# Patient Record
Sex: MALE | Race: OTHER | NOT HISPANIC OR LATINO | URBAN - METROPOLITAN AREA
[De-identification: names, ages, dates, MRNs, and addresses within clinical notes are randomized per-mention and may not be internally consistent; named-entity substitution may affect disease eponyms.]

---

## 2017-03-01 ENCOUNTER — IMPORTED ENCOUNTER (OUTPATIENT)
Dept: URBAN - METROPOLITAN AREA CLINIC 1 | Facility: CLINIC | Age: 62
End: 2017-03-01

## 2017-03-01 PROBLEM — H40.013: Noted: 2017-03-01

## 2017-03-01 PROBLEM — H25.13: Noted: 2017-03-01

## 2017-03-01 PROBLEM — H43.393: Noted: 2017-03-01

## 2017-03-01 PROBLEM — H53.19: Noted: 2017-03-01

## 2017-03-01 PROCEDURE — 92004 COMPRE OPH EXAM NEW PT 1/>: CPT

## 2017-03-01 PROCEDURE — 92015 DETERMINE REFRACTIVE STATE: CPT

## 2017-03-01 PROCEDURE — 92250 FUNDUS PHOTOGRAPHY W/I&R: CPT

## 2017-03-01 NOTE — PATIENT DISCUSSION
1.  Floaters OU- RD precautions. 2.  Photopsia OD 3. Cataract OU: Observe for now without intervention. The patient was advised to contact us if any change or worsening of vision4. COAG suspect OU (CD 0.75 OU): IOP 15 OU today. Condition was discussed with patient. Will monitor patient for progression. Baseline fundus photos today. MRX for glasses givenReturn for an appointment in 1 month for 10 OCT and VF 24-2 with Dr. Taina Pena.

## 2017-04-05 ENCOUNTER — IMPORTED ENCOUNTER (OUTPATIENT)
Dept: URBAN - METROPOLITAN AREA CLINIC 1 | Facility: CLINIC | Age: 62
End: 2017-04-05

## 2017-04-05 PROBLEM — H25.13: Noted: 2017-04-05

## 2017-04-05 PROBLEM — H40.023: Noted: 2017-04-05

## 2017-04-05 PROCEDURE — 92133 CPTRZD OPH DX IMG PST SGM ON: CPT

## 2017-04-05 PROCEDURE — 92012 INTRM OPH EXAM EST PATIENT: CPT

## 2017-04-05 PROCEDURE — 92083 EXTENDED VISUAL FIELD XM: CPT

## 2017-04-05 NOTE — PATIENT DISCUSSION
1.  Glaucoma Suspect OU (CD 0.75 OU): IOP 17 OU. HVF normal. OCT questionable showin RNFL thinning. Will repeat in 6 months. Patient is considered high risk. Condition was discussed with patient and patient understands. Will continue to monitor patient for any progression in condition. Patient was advised to call us with any problems questions or concerns. 2.  Cataract OU: Observe for now without intervention. The patient was advised to contact us if any change or worsening of vision3. Floaters OUReturn for an appointment in 6 months 10/DFE/OCT with Dr. Gretel Mott.

## 2017-10-05 ENCOUNTER — IMPORTED ENCOUNTER (OUTPATIENT)
Dept: URBAN - METROPOLITAN AREA CLINIC 1 | Facility: CLINIC | Age: 62
End: 2017-10-05

## 2017-10-05 PROBLEM — H40.1131: Noted: 2017-10-05

## 2017-10-05 PROCEDURE — 92133 CPTRZD OPH DX IMG PST SGM ON: CPT

## 2017-10-05 PROCEDURE — 99213 OFFICE O/P EST LOW 20 MIN: CPT

## 2017-10-05 NOTE — PATIENT DISCUSSION
1.  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 T Max IOP 20 OU-Goal range 14-16. Patient to start gtt regimen  Travatan Z QhS OU-sample given today. Patient advised to be compliant with gtts. Condition was discussed with patient and patient understands. Will continue to monitor patient for any progression in condition. Patient was advised to call us with any problems questions or concerns. OCT was done today showing minimal and moderate thinning OU. 2.  Return for an appointment in 2-3 weeks for 10 and VF 24-2 with Dr. Maribel Edgar.

## 2017-10-19 ENCOUNTER — IMPORTED ENCOUNTER (OUTPATIENT)
Dept: URBAN - METROPOLITAN AREA CLINIC 1 | Facility: CLINIC | Age: 62
End: 2017-10-19

## 2017-10-19 PROBLEM — H40.1131: Noted: 2017-10-19

## 2017-10-19 PROCEDURE — 92083 EXTENDED VISUAL FIELD XM: CPT

## 2017-10-19 PROCEDURE — 99213 OFFICE O/P EST LOW 20 MIN: CPT

## 2017-10-19 NOTE — PATIENT DISCUSSION
1.  1.  Mild Open Angle Glaucoma OU-(0.80/0.85)  IOP was 13/13. T Max IOP 20 OU-Goal range 14-16. Patient to continue  gtt regimen  Travatan Z QhS OU (Erx today). Patient advised to be compliant with gtts. Condition was discussed with patient and patient understands. Will continue to monitor patient for any progression in condition. Patient was advised to call us with any problems questions or concerns. VF was done today results was normal. 2.  Return for an appointment in March for 30 with Dr. Misti Andres.

## 2018-03-19 ENCOUNTER — IMPORTED ENCOUNTER (OUTPATIENT)
Dept: URBAN - METROPOLITAN AREA CLINIC 1 | Facility: CLINIC | Age: 63
End: 2018-03-19

## 2018-03-19 PROBLEM — H43.393: Noted: 2018-03-19

## 2018-03-19 PROBLEM — H10.45: Noted: 2018-03-19

## 2018-03-19 PROBLEM — H17.821: Noted: 2018-03-19

## 2018-03-19 PROBLEM — H25.813: Noted: 2018-03-19

## 2018-03-19 PROBLEM — H40.1131: Noted: 2018-03-19

## 2018-03-19 PROBLEM — H04.123: Noted: 2018-03-19

## 2018-03-19 PROCEDURE — 92014 COMPRE OPH EXAM EST PT 1/>: CPT

## 2018-03-19 PROCEDURE — 92015 DETERMINE REFRACTIVE STATE: CPT

## 2018-03-19 NOTE — PATIENT DISCUSSION
1.  Mild Open Angle Glaucoma OU (CD 0.80/0.85) - IOP stable. T Max IOP 20 OU-Goal range 14-16. Patient to continue  gtt regimen  Travatan Z QhS OU Patient advised to be compliant with gtts. Condition was discussed with patient and patient understands. Will continue to monitor patient for any progression in condition. Patient was advised to call us with any problems questions or concerns. 2.  Dry Eyes OU -- Recommended to patient to use Artificial Tears BID OU3. Allergic Conjunctivitis OU :  Condition discussed with patient. Advised patient to use OTC Zaditor one drop OU BID prn.4. Cataract OU: Observe for now without intervention. The patient was advised to contact us if any change or worsening of vision5. Peripheral Corneal Scar OD 6. Floaters OU - stable. RD precautionsMRX for glasses given Return for an appointment in 4 months 10 (IOP check) with Dr. Douglas Miles.

## 2018-07-19 ENCOUNTER — IMPORTED ENCOUNTER (OUTPATIENT)
Dept: URBAN - METROPOLITAN AREA CLINIC 1 | Facility: CLINIC | Age: 63
End: 2018-07-19

## 2018-07-19 PROBLEM — H04.123: Noted: 2018-07-19

## 2018-07-19 PROBLEM — H40.1131: Noted: 2018-07-19

## 2018-07-19 PROCEDURE — 99213 OFFICE O/P EST LOW 20 MIN: CPT

## 2018-07-19 NOTE — PATIENT DISCUSSION
1.  Mild Open Angle Glaucoma OU (CD 0.80/0.85) -- IOP stable at 13 OU today. T-Max IOP 20 OU. Goal IOP range 14-16. Patient to continue  gtt regimen  Travatan Z QHS OU Patient advised to be compliant with gtts. Condition was discussed with patient and patient understands. Will continue to monitor patient for any progression in condition. Patient was advised to call us with any problems questions or concerns. 2.  Dry Eyes OU -- Recommend to patient to use frequent Artificial Tears TID-QID OU3. Allergic Conjunctivitis OU -- Condition discussed with patient. Advised patient to use OTC Zaditor one drop OU BID prn.4. Cataract OU: Observe for now without intervention. The patient was advised to contact us if any change or worsening of vision5. Peripheral Corneal Scar OD 6. Floaters OU -- Stable. RD precautionsReturn for an appointment in 4 MO for a 10 / 24-2 HVF OU with Dr. Milton aBng.

## 2018-11-20 ENCOUNTER — IMPORTED ENCOUNTER (OUTPATIENT)
Dept: URBAN - METROPOLITAN AREA CLINIC 1 | Facility: CLINIC | Age: 63
End: 2018-11-20

## 2018-11-20 PROBLEM — H40.1131: Noted: 2018-11-20

## 2018-11-20 PROCEDURE — 92083 EXTENDED VISUAL FIELD XM: CPT

## 2018-11-20 PROCEDURE — 99213 OFFICE O/P EST LOW 20 MIN: CPT

## 2018-11-20 NOTE — PATIENT DISCUSSION
Dry Eyes OU -- Recommend to patient to use frequent Artificial Tears TID-QID OU3. Allergic Conjunctivitis OU -- Condition discussed with patient. Advised patient to use OTC Zaditor one drop OU BID prn.4. Cataract OU: Observe  5. Peripheral Corneal Scar OD 6. H/o Floaters OU -- Stable.  RD precautions

## 2018-11-20 NOTE — PATIENT DISCUSSION
1.  Mild Open Angle Glaucoma OU (CD 0.80/0.85) - HVF stable WNL OU. IOP stable 12 OU. T-Max IOP 20 OU. Goal IOP range 14-16. Cont Travatan Z QHS OU. Patient advised to be compliant with gtts. 2.  Dry Eyes OU -- Recommend to patient to use frequent Artificial Tears TID-QID OU3. Allergic Conjunctivitis OU -- Condition discussed with patient. Advised patient to use OTC Zaditor one drop OU BID prn.4. Cataract OU: Observe  5. Peripheral Corneal Scar OD 6. H/o Floaters OU -- Stable. RD precautionsReturn for an appointment in 4 months 30/OCT with Dr. Gretel Mott.

## 2019-03-20 ENCOUNTER — IMPORTED ENCOUNTER (OUTPATIENT)
Dept: URBAN - METROPOLITAN AREA CLINIC 1 | Facility: CLINIC | Age: 64
End: 2019-03-20

## 2019-03-20 PROBLEM — H04.123: Noted: 2019-03-20

## 2019-03-20 PROBLEM — H40.1131: Noted: 2019-03-20

## 2019-03-20 PROBLEM — H10.45: Noted: 2019-03-20

## 2019-03-20 PROBLEM — H25.813: Noted: 2019-03-20

## 2019-03-20 PROCEDURE — 92133 CPTRZD OPH DX IMG PST SGM ON: CPT

## 2019-03-20 PROCEDURE — 92015 DETERMINE REFRACTIVE STATE: CPT

## 2019-03-20 PROCEDURE — 92014 COMPRE OPH EXAM EST PT 1/>: CPT

## 2019-03-20 NOTE — PATIENT DISCUSSION
1.  Mild Open Angle Glaucoma OU (CD: 0.80/0.85) -- IOP stable at 15 OU today. T-MAX IOP: 20 OU. Goal IOP Range: 14-16. Continue Travatan-Z QHS OU. OCT today shows moderate thinning OU. Patient to continue with current gtt regimen. Patient advised to be compliant with gtts. Condition was discussed with patient and patient understands. Will continue to monitor patient for any progression in condition. Patient was advised to call us with any problems questions or concerns. 2.  Cataracts OU -- Observe for now without intervention. The patient was advised to contact us if any change or worsening of vision. 3. Dry Eyes OU -- Recommend continue the frequent use of OTC Artificial Tears TID-QID OU Routinely. 4.  Allergic Conjunctivitis OU -- The condition was discussed with the patient. Avoidance of allergens and cool compresses were recommended. Recommend the use of OTC Zaditor BID OU PRN Itching. 5. Peripheral Corneal Scar OD 6. Vitreous Floaters OU -- Stable. RD precautions given. Observe / Monitor. Finalized Glasses MRx was given to patient today. Return for an appointment in 4 MO for a 10 (IOP Check OU) with Dr. Ramez Alan.

## 2019-07-09 ENCOUNTER — IMPORTED ENCOUNTER (OUTPATIENT)
Dept: URBAN - METROPOLITAN AREA CLINIC 1 | Facility: CLINIC | Age: 64
End: 2019-07-09

## 2019-07-09 PROBLEM — H17.821: Noted: 2019-07-09

## 2019-07-09 PROBLEM — H10.45: Noted: 2019-07-09

## 2019-07-09 PROBLEM — H40.1131: Noted: 2019-07-09

## 2019-07-09 PROBLEM — H04.123: Noted: 2019-07-09

## 2019-07-09 PROBLEM — H25.813: Noted: 2019-07-09

## 2019-07-09 PROCEDURE — 92012 INTRM OPH EXAM EST PATIENT: CPT

## 2019-07-09 NOTE — PATIENT DISCUSSION
1.  Mild Open Angle Glaucoma OU (CD: 0.80/0.85) -- IOP stable at 16 OU today. T-MAX IOP: 20 OU. Goal IOP Range: 14-16. Continue Travatan-Z QHS OU. Patient to continue with current gtt regimen. Patient advised to be compliant with gtts. Condition was discussed with patient and patient understands. Will continue to monitor patient for any progression in condition. Patient was advised to call us with any problems questions or concerns. 2.  Dry Eyes OU -- Recommend continue the frequent use of OTC Artificial Tears TID-QID OU Routinely. 3.  Cataracts OU -- Observe. 4. Allergic Conjunctivitis OU -- Recommend the use of OTC Zaditor BID OU PRN Itching. 5. Peripheral Corneal Scar OD 6. H/o Vitreous Floaters OUReturn for an appointment in 4 months for a 10/HVF with Dr. Freddy Ashford.

## 2019-11-04 ENCOUNTER — IMPORTED ENCOUNTER (OUTPATIENT)
Dept: URBAN - METROPOLITAN AREA CLINIC 1 | Facility: CLINIC | Age: 64
End: 2019-11-04

## 2019-11-04 PROBLEM — H10.45: Noted: 2019-11-04

## 2019-11-04 PROBLEM — H04.123: Noted: 2019-11-04

## 2019-11-04 PROBLEM — H17.821: Noted: 2019-11-04

## 2019-11-04 PROBLEM — H40.1131: Noted: 2019-11-04

## 2019-11-04 PROBLEM — H25.813: Noted: 2019-11-04

## 2019-11-04 PROCEDURE — 92083 EXTENDED VISUAL FIELD XM: CPT

## 2019-11-04 PROCEDURE — 92012 INTRM OPH EXAM EST PATIENT: CPT

## 2019-11-04 NOTE — PATIENT DISCUSSION
1.  Mild Open Angle Glaucoma OU (CD 0.80/0.85) - HVF WNL OD non specific loss shown OS. IOP stable cont Travatan Z QHS OU. Patient advised to be compliant with gtts. T-MAX IOP: 20 OU. Goal IOP Range: 14-16. 2. Dry Eyes OU - Recommended to patient to use Artificial Tears BID OU3. Cataract OU: Observe for now without intervention. The patient was advised to contact us if any change or worsening of vision4. Allergic Conjunctivitis OU :  Condition discussed with patient. Advised patient to use OTC Zaditor one drop OU BID prn.5. Peripheral Corneal Scar OD - Stable 6. H/o Vitreous Floaters OUReturn for an appointment in 4 months 30/OCT with Dr. Taina Pena.

## 2020-03-03 ENCOUNTER — IMPORTED ENCOUNTER (OUTPATIENT)
Dept: URBAN - METROPOLITAN AREA CLINIC 1 | Facility: CLINIC | Age: 65
End: 2020-03-03

## 2020-03-03 PROBLEM — H04.123: Noted: 2020-03-03

## 2020-03-03 PROBLEM — H10.45: Noted: 2020-03-03

## 2020-03-03 PROBLEM — H25.813: Noted: 2020-03-03

## 2020-03-03 PROBLEM — H40.1131: Noted: 2020-03-03

## 2020-03-03 PROCEDURE — 92015 DETERMINE REFRACTIVE STATE: CPT

## 2020-03-03 PROCEDURE — 92014 COMPRE OPH EXAM EST PT 1/>: CPT

## 2020-03-03 PROCEDURE — 92133 CPTRZD OPH DX IMG PST SGM ON: CPT

## 2020-03-03 NOTE — PATIENT DISCUSSION
1.  Mild Open Angle Glaucoma OU (0.80/0.85) -- IOP today 15 OU. Cont Travatan Z QHS OU. Patient advised to be compliant with gtts. T-MAX IOP: 20 OU. Goal IOP Range: 14-16. OCT done today is stable OU. 2. Dry Eyes OU -- Cont Artificial Tears BID OU routinely. 3. Cataract OU -- Observe for now without intervention. 4.  Allergic Conjunctivitis OU -- Condition discussed with patient. Cont OTC Zaditor BID OU prn.5. Peripheral Corneal Scar OD -- Stable. 6.  H/o Vitreous Floaters OUReturn for an appointment in 4 month 10/IOP check with Dr. Rebeca Bañuelos.

## 2020-07-07 ENCOUNTER — IMPORTED ENCOUNTER (OUTPATIENT)
Dept: URBAN - METROPOLITAN AREA CLINIC 1 | Facility: CLINIC | Age: 65
End: 2020-07-07

## 2020-07-07 PROBLEM — H40.1131: Noted: 2020-07-07

## 2020-07-07 PROCEDURE — 99213 OFFICE O/P EST LOW 20 MIN: CPT

## 2020-07-07 NOTE — PATIENT DISCUSSION
1.  Mild Open Angle Glaucoma OU (0.80/0.85) -- IOP stable today 16 OU. Cont Travatan Z QHS OU. Patient advised to be compliant with gtts. T-MAX IOP: 20 OU. Goal IOP Range: 14-16. 2. Dry Eyes OU -- Cont Artificial Tears BID OU routinely. 3. Cataract OU -- Observe for now without intervention. 4.  Allergic Conjunctivitis OU -- Condition discussed with patient. Cont OTC Zaditor BID OU prn.5. Peripheral Corneal Scar OD -- Stable. 6.  H/o Vitreous Floaters OUReturn for an appointment in 4 Months 10/VF/MRx with Dr. Vera Miguel.

## 2020-11-03 ENCOUNTER — IMPORTED ENCOUNTER (OUTPATIENT)
Dept: URBAN - METROPOLITAN AREA CLINIC 1 | Facility: CLINIC | Age: 65
End: 2020-11-03

## 2020-11-03 PROBLEM — H40.1131: Noted: 2020-11-03

## 2020-11-03 PROBLEM — T15.02XA: Noted: 2020-11-03

## 2020-11-03 PROCEDURE — 65222 REMOVE FOREIGN BODY FROM EYE: CPT

## 2020-11-03 PROCEDURE — 92083 EXTENDED VISUAL FIELD XM: CPT

## 2020-11-03 PROCEDURE — 92012 INTRM OPH EXAM EST PATIENT: CPT

## 2020-11-03 PROCEDURE — 92015 DETERMINE REFRACTIVE STATE: CPT

## 2020-11-03 NOTE — PATIENT DISCUSSION
Corneal foreign body OS: removed with 27g needle and/or foreign body spud and krunal. Prescription given for antibiotic to use in affected eye QID for 4 to 7 days.

## 2020-11-03 NOTE — PATIENT DISCUSSION
1.  Mild Open Angle Glaucoma OU (0.80/0.85) -- IOP 15 OU. Goal Range 14-16 OU. T-Max: 20 OU. Cont Travatan Z QHS OU. VF today stable shows no defects. Patient advised to be compliant with gtts. 2.  Corneal foreign body OS -- Begin Ofloxacin TID OS until gone (Erx'd). Removed in slit lamp by RBF with forceps. 2 metallic foreign bodies noted 1 central 1 periphery. Secondary to working with metal at work. Consent form scanned into attachments. 3.  Dry Eyes OU -- Cont the use of ATs BID OU routinely. 4. Cataract OU -- Observe for now without intervention. Pt noticing small changes in va however no change in MRx today will assess during next 30 exam. 5.  Allergic Conjunctivitis OU -- Condition discussed with patient. Cont OTC Zaditor BID OU prn.6. Peripheral Corneal Scar OD -- Stable. 7.  H/o Vitreous Floaters OUMRx for glasses given to patient today. Return for an appointment in 4 months 30/OCT with Dr. Arlen Dejesus.

## 2021-03-05 ENCOUNTER — IMPORTED ENCOUNTER (OUTPATIENT)
Dept: URBAN - METROPOLITAN AREA CLINIC 1 | Facility: CLINIC | Age: 66
End: 2021-03-05

## 2021-03-05 PROBLEM — H43.393: Noted: 2021-03-05

## 2021-03-05 PROBLEM — H10.45: Noted: 2021-03-05

## 2021-03-05 PROBLEM — H25.813: Noted: 2021-03-05

## 2021-03-05 PROBLEM — H40.1131: Noted: 2021-03-05

## 2021-03-05 PROBLEM — H04.123: Noted: 2021-03-05

## 2021-03-05 PROCEDURE — 99214 OFFICE O/P EST MOD 30 MIN: CPT

## 2021-03-05 PROCEDURE — 92133 CPTRZD OPH DX IMG PST SGM ON: CPT

## 2021-03-05 PROCEDURE — 92015 DETERMINE REFRACTIVE STATE: CPT

## 2021-03-05 NOTE — PATIENT DISCUSSION
1.  Mild Open Angle Glaucoma OU -- (C/D: 0.80/0.85) OCT shows no progression OU stable. IOP stable at 14/15. Goal Range 14-16. T-Max 20 OU. Cont Travatan Z QHS OU. Patient advised to be compliant with gtts. 2.  Dry Eyes OU -- Cont Artificial Tears BID OU routinely. 3. Cataract OU -- No progression OU. Non-surgical at this time continue to monitor for progression. Advised patient to call us with any changes or worsening in vision. 4.  Allergic Conjunctivitis OU -- Condition discussed with patient. Cont Zaditor BID OU PRN itching. 5. Peripheral Corneal Scar OD -- Stable. 6.  Remnant Chalazion OS -- Stable. Cont daily hot compresses TID x 5 minutes. 7.  Vitreous Floaters OU -- Stable. RD Precautions. MRx for glasses given to patient. Return for an appointment in 4 month 10/IOP check with Dr. Hans Rodriguez.

## 2021-07-06 ENCOUNTER — IMPORTED ENCOUNTER (OUTPATIENT)
Dept: URBAN - METROPOLITAN AREA CLINIC 1 | Facility: CLINIC | Age: 66
End: 2021-07-06

## 2021-07-06 PROBLEM — H25.813: Noted: 2021-07-06

## 2021-07-06 PROBLEM — H40.1131: Noted: 2021-07-06

## 2021-07-06 PROBLEM — H04.123: Noted: 2021-07-06

## 2021-07-06 PROCEDURE — 92012 INTRM OPH EXAM EST PATIENT: CPT

## 2021-07-06 NOTE — PATIENT DISCUSSION
1.  Mild Open Angle Glaucoma OU -- (C/D: 0.80/0.85)  IOP stable 14 OU today. Goal Range 14-16. T-Max 20 OU. Cont Travatan Z QHS OU. Patient advised to be compliant with gtts. 2.  Dry Eyes OU -- Cont Artificial Tears BID OU routinely. 3. Cataract OU -- No progression OU. Non-surgical at this time continue to monitor for progression. Advised patient to call us with any changes or worsening in vision. 4.  Allergic Conjunctivitis OU -- Condition discussed with patient. Cont Zaditor BID OU PRN itching. 5. Peripheral Corneal Scar OD -- Stable. 6.  Remnant Chalazion OS -- Stable. Cont daily hot compresses TID x 5 minutes. 7.  H/o Vitreous Floaters OU Return for an appointment in 4 months for a 10/F 24-2 with Dr. Elfego Lee.

## 2021-11-03 ENCOUNTER — IMPORTED ENCOUNTER (OUTPATIENT)
Dept: URBAN - METROPOLITAN AREA CLINIC 1 | Facility: CLINIC | Age: 66
End: 2021-11-03

## 2021-11-03 PROBLEM — H40.1131: Noted: 2021-11-03

## 2021-11-03 PROCEDURE — 92083 EXTENDED VISUAL FIELD XM: CPT

## 2021-11-03 PROCEDURE — 92012 INTRM OPH EXAM EST PATIENT: CPT

## 2021-11-03 PROCEDURE — 92015 DETERMINE REFRACTIVE STATE: CPT

## 2021-11-03 NOTE — PATIENT DISCUSSION
1.  Mild Open Angle Glaucoma OU -- (C/D: 0.80/0.85)  IOP stable 14 OU today. Goal Range 14-16. T-Max 20 OU. HVF today essentially WNL OU. Cont Travatan Z QHS OU. Patient advised to be compliant with gtts. 2.  Dry Eyes OU -- Cont Artificial Tears BID OU routinely. 3. Cataract OU -- No progression OU. Non-surgical at this time continue to monitor for progression. Advised patient to call us with any changes or worsening in vision. 4.  Allergic Conjunctivitis OU -- Condition discussed with patient. Cont Zaditor BID OU PRN itching. 5. Peripheral Corneal Scar OD -- Stable. 6.  H/o Vitreous Floaters OU Finalized MRX today per patients request. Return for an appointment in 4 months for a 30/OCT/glare with Dr. Valeria Loyd.

## 2022-03-07 ENCOUNTER — COMPREHENSIVE EXAM (OUTPATIENT)
Dept: URBAN - METROPOLITAN AREA CLINIC 1 | Facility: CLINIC | Age: 67
End: 2022-03-07

## 2022-03-07 DIAGNOSIS — H10.45: ICD-10-CM

## 2022-03-07 DIAGNOSIS — H25.813: ICD-10-CM

## 2022-03-07 DIAGNOSIS — H43.393: ICD-10-CM

## 2022-03-07 DIAGNOSIS — H04.123: ICD-10-CM

## 2022-03-07 DIAGNOSIS — H40.1131: ICD-10-CM

## 2022-03-07 PROCEDURE — 92133 CPTRZD OPH DX IMG PST SGM ON: CPT

## 2022-03-07 PROCEDURE — 92014 COMPRE OPH EXAM EST PT 1/>: CPT

## 2022-03-07 ASSESSMENT — TONOMETRY
OS_IOP_MMHG: 15
OD_IOP_MMHG: 15

## 2022-03-07 ASSESSMENT — VISUAL ACUITY
OS_CC: 20/20-2
OS_CC: J1+
OD_CC: 20/25-2
OD_CC: J1+

## 2022-03-07 NOTE — PATIENT DISCUSSION
(C/D: 0.80/0.85) IOP stable 15 OU today. Goal Range 14-16. T-Max 20 OU. No progression by OCT today. Cont Travatan Z QHS OU. Patient advised to be compliant with gtts.

## 2022-04-02 ASSESSMENT — TONOMETRY
OD_IOP_MMHG: 14
OD_IOP_MMHG: 13
OD_IOP_MMHG: 14
OS_IOP_MMHG: 15
OS_IOP_MMHG: 17
OD_IOP_MMHG: 15
OS_IOP_MMHG: 16
OD_IOP_MMHG: 20
OD_IOP_MMHG: 14
OD_IOP_MMHG: 15
OS_IOP_MMHG: 16
OD_IOP_MMHG: 15
OS_IOP_MMHG: 15
OS_IOP_MMHG: 13
OS_IOP_MMHG: 16
OD_IOP_MMHG: 14
OS_IOP_MMHG: 14
OS_IOP_MMHG: 15
OD_IOP_MMHG: 16
OS_IOP_MMHG: 15
OS_IOP_MMHG: 12
OS_IOP_MMHG: 20
OD_IOP_MMHG: 16
OS_IOP_MMHG: 15
OS_IOP_MMHG: 14
OD_IOP_MMHG: 17
OD_IOP_MMHG: 13
OS_IOP_MMHG: 13
OS_IOP_MMHG: 13
OD_IOP_MMHG: 15
OD_IOP_MMHG: 16
OD_IOP_MMHG: 12

## 2022-04-02 ASSESSMENT — VISUAL ACUITY
OD_SC: 20/30-1
OD_GLARE: 20/80
OD_CC: 20/30+2
OS_SC: 20/20-1
OD_GLARE: 20/60
OD_CC: J1
OS_SC: 20/25
OS_CC: J1
OS_SC: 20/25
OS_CC: J1
OD_SC: 20/30
OS_SC: 20/20-1
OS_SC: 20/25-1
OS_CC: J2
OS_CC: J1
OS_GLARE: 20/50
OD_SC: 20/25
OD_SC: 20/30
OS_SC: 20/20
OD_GLARE: 20/80
OS_GLARE: 20/60
OS_SC: 20/20-1
OS_SC: 20/25-1
OS_SC: 20/20
OD_SC: 20/25
OS_GLARE: 20/80
OD_CC: J1
OS_SC: 20/20
OS_GLARE: 20/60
OS_SC: 20/25
OD_SC: 20/20-1
OS_SC: 20/20-1
OD_SC: 20/20-1
OS_CC: J1
OD_SC: 20/30
OS_SC: 20/20-2
OD_SC: 20/25-1
OD_SC: 20/30
OD_SC: 20/25
OD_SC: 20/25
OS_SC: 20/20
OD_CC: J1
OD_CC: J2
OD_CC: J1
OD_SC: 20/20
OS_GLARE: 20/60
OD_GLARE: 20/80
OS_CC: 20/25
OD_GLARE: 20/80
OD_SC: 20/25
OD_GLARE: 20/80
OS_SC: 20/20
OD_SC: 20/20-1
OS_GLARE: 20/80

## 2022-04-02 ASSESSMENT — KERATOMETRY
OD_K2POWER_DIOPTERS: 41.00
OS_AXISANGLE2_DEGREES: 088
OD_AXISANGLE2_DEGREES: 091
OS_K2POWER_DIOPTERS: 41.25
OD_AXISANGLE_DEGREES: 001
OS_K1POWER_DIOPTERS: 44.00
OS_AXISANGLE_DEGREES: 178
OD_K1POWER_DIOPTERS: 44.50

## 2022-08-18 ENCOUNTER — COMPREHENSIVE EXAM (OUTPATIENT)
Dept: URBAN - METROPOLITAN AREA CLINIC 1 | Facility: CLINIC | Age: 67
End: 2022-08-18

## 2022-08-18 DIAGNOSIS — H40.1131: ICD-10-CM

## 2022-08-18 PROCEDURE — 92012 INTRM OPH EXAM EST PATIENT: CPT

## 2022-08-18 ASSESSMENT — TONOMETRY
OD_IOP_MMHG: 14
OS_IOP_MMHG: 12

## 2022-08-18 ASSESSMENT — VISUAL ACUITY
OD_CC: 20/25-2
OS_CC: 20/20

## 2022-08-18 NOTE — PATIENT DISCUSSION
(C/D: 0.80/0.85) IOP stable 14/12 OU today. Goal Range 14-16. T-Max 20 OU. No progression by OCT today. Cont Travatan Z QHS OU. Patient advised to be compliant with gtts. Patient will return in 4months for 10/HVF.

## 2022-12-21 ENCOUNTER — FOLLOW UP (OUTPATIENT)
Dept: URBAN - METROPOLITAN AREA CLINIC 1 | Facility: CLINIC | Age: 67
End: 2022-12-21

## 2022-12-21 PROCEDURE — 92083 EXTENDED VISUAL FIELD XM: CPT

## 2022-12-21 PROCEDURE — 99213 OFFICE O/P EST LOW 20 MIN: CPT

## 2022-12-21 ASSESSMENT — VISUAL ACUITY
OS_CC: 20/20-2
OD_CC: 20/25-2

## 2022-12-21 ASSESSMENT — TONOMETRY
OS_IOP_MMHG: 13
OD_IOP_MMHG: 12

## 2022-12-21 NOTE — PATIENT DISCUSSION
(C/D: 0.80/0.85) IOP stable 12/13 today. Goal Range 14-16. T-Max 20 OU. HVF today WNL OU. Cont Travaprost QHS OU. Patient advised to be compliant with gtts.

## 2023-06-27 ENCOUNTER — COMPREHENSIVE EXAM (OUTPATIENT)
Dept: URBAN - METROPOLITAN AREA CLINIC 1 | Facility: CLINIC | Age: 68
End: 2023-06-27

## 2023-06-27 DIAGNOSIS — H10.45: ICD-10-CM

## 2023-06-27 DIAGNOSIS — H25.813: ICD-10-CM

## 2023-06-27 DIAGNOSIS — H04.123: ICD-10-CM

## 2023-06-27 DIAGNOSIS — H43.813: ICD-10-CM

## 2023-06-27 DIAGNOSIS — H40.1131: ICD-10-CM

## 2023-06-27 PROCEDURE — 99214 OFFICE O/P EST MOD 30 MIN: CPT

## 2023-06-27 PROCEDURE — 92133 CPTRZD OPH DX IMG PST SGM ON: CPT

## 2023-06-27 RX ORDER — OLOPATADINE HYDROCHLORIDE 2 MG/ML: 1 SOLUTION OPHTHALMIC AS NEEDED

## 2023-06-27 ASSESSMENT — VISUAL ACUITY
OS_CC: 20/30
OD_CC: J2
OD_CC: 20/30
OD_BAT: 20/50
OS_CC: J1
OS_BAT: 20/40

## 2023-06-27 ASSESSMENT — TONOMETRY
OS_IOP_MMHG: 13
OD_IOP_MMHG: 13

## 2024-01-04 ENCOUNTER — FOLLOW UP (OUTPATIENT)
Dept: URBAN - METROPOLITAN AREA CLINIC 1 | Facility: CLINIC | Age: 69
End: 2024-01-04

## 2024-01-04 DIAGNOSIS — H40.1131: ICD-10-CM

## 2024-01-04 PROCEDURE — 99213 OFFICE O/P EST LOW 20 MIN: CPT

## 2024-01-04 PROCEDURE — 92083 EXTENDED VISUAL FIELD XM: CPT

## 2024-01-04 ASSESSMENT — VISUAL ACUITY
OD_BAT: 20/50
OS_BAT: 20/40

## 2024-01-04 ASSESSMENT — TONOMETRY
OS_IOP_MMHG: 13
OD_IOP_MMHG: 12

## 2024-06-05 ENCOUNTER — COMPREHENSIVE EXAM (OUTPATIENT)
Dept: URBAN - METROPOLITAN AREA CLINIC 1 | Facility: CLINIC | Age: 69
End: 2024-06-05

## 2024-06-05 DIAGNOSIS — H04.123: ICD-10-CM

## 2024-06-05 DIAGNOSIS — H17.13: ICD-10-CM

## 2024-06-05 DIAGNOSIS — H43.813: ICD-10-CM

## 2024-06-05 DIAGNOSIS — H10.45: ICD-10-CM

## 2024-06-05 DIAGNOSIS — H40.1131: ICD-10-CM

## 2024-06-05 DIAGNOSIS — H25.813: ICD-10-CM

## 2024-06-05 PROCEDURE — 99214 OFFICE O/P EST MOD 30 MIN: CPT

## 2024-06-05 PROCEDURE — 92015 DETERMINE REFRACTIVE STATE: CPT

## 2024-06-05 PROCEDURE — 92133 CPTRZD OPH DX IMG PST SGM ON: CPT

## 2024-06-05 ASSESSMENT — VISUAL ACUITY
OS_BAT: 20/60
OD_BAT: 20/100
OS_CC: 20/25+1
OD_CC: J2
OS_CC: J2
OD_CC: 20/40+2

## 2024-06-05 ASSESSMENT — TONOMETRY
OS_IOP_MMHG: 15
OD_IOP_MMHG: 15

## 2024-06-11 ENCOUNTER — PRE-OP/H&P (OUTPATIENT)
Dept: URBAN - METROPOLITAN AREA CLINIC 1 | Facility: CLINIC | Age: 69
End: 2024-06-11

## 2024-06-11 VITALS
HEIGHT: 71 IN | BODY MASS INDEX: 25.2 KG/M2 | SYSTOLIC BLOOD PRESSURE: 139 MMHG | HEART RATE: 74 BPM | WEIGHT: 180 LBS | DIASTOLIC BLOOD PRESSURE: 75 MMHG

## 2024-06-11 DIAGNOSIS — H25.813: ICD-10-CM

## 2024-06-11 PROCEDURE — 92025 CPTRIZED CORNEAL TOPOGRAPHY: CPT | Mod: NC

## 2024-06-11 PROCEDURE — 99213 OFFICE O/P EST LOW 20 MIN: CPT

## 2024-06-11 PROCEDURE — 92136 OPHTHALMIC BIOMETRY: CPT

## 2024-06-11 ASSESSMENT — TONOMETRY
OD_IOP_MMHG: 14
OS_IOP_MMHG: 13

## 2024-06-11 ASSESSMENT — VISUAL ACUITY
OS_CC: 20/25
OD_BAT: 20/100
OD_CC: 20/40
OS_BAT: 20/60

## 2024-07-24 ENCOUNTER — SURGERY/PROCEDURE (OUTPATIENT)
Dept: URBAN - METROPOLITAN AREA SURGERY 1 | Facility: SURGERY | Age: 69
End: 2024-07-24

## 2024-07-24 DIAGNOSIS — H25.812: ICD-10-CM

## 2024-07-24 PROCEDURE — 68841 INSJ RX ELUT IMPLT LAC CANAL: CPT | Mod: 51,LT

## 2024-07-24 PROCEDURE — 99199PCV PROF CUSTOM VISION PACKAGE

## 2024-07-24 PROCEDURE — 66984CV REMOVE CATARACT, INSERT LENS, CUSTOM VISION

## 2024-07-25 ENCOUNTER — POST-OP (OUTPATIENT)
Dept: URBAN - METROPOLITAN AREA CLINIC 2 | Facility: CLINIC | Age: 69
End: 2024-07-25

## 2024-07-25 DIAGNOSIS — Z96.1: ICD-10-CM

## 2024-07-25 PROCEDURE — 99024 POSTOP FOLLOW-UP VISIT: CPT

## 2024-07-25 ASSESSMENT — TONOMETRY: OS_IOP_MMHG: 16

## 2024-07-25 ASSESSMENT — VISUAL ACUITY
OS_SC: 20/30-1
OS_PH: 20/20-2

## 2024-07-30 ENCOUNTER — POST OP/EVAL OF SECOND EYE (OUTPATIENT)
Dept: URBAN - METROPOLITAN AREA CLINIC 1 | Facility: CLINIC | Age: 69
End: 2024-07-30

## 2024-07-30 VITALS
DIASTOLIC BLOOD PRESSURE: 75 MMHG | HEART RATE: 74 BPM | WEIGHT: 180 LBS | SYSTOLIC BLOOD PRESSURE: 139 MMHG | HEIGHT: 71 IN | BODY MASS INDEX: 25.2 KG/M2

## 2024-07-30 DIAGNOSIS — Z96.1: ICD-10-CM

## 2024-07-30 DIAGNOSIS — H25.811: ICD-10-CM

## 2024-07-30 PROCEDURE — 92136 OPHTHALMIC BIOMETRY: CPT | Mod: 26

## 2024-07-30 PROCEDURE — 92025 CPTRIZED CORNEAL TOPOGRAPHY: CPT | Mod: NC

## 2024-07-30 ASSESSMENT — TONOMETRY
OS_IOP_MMHG: 12
OD_IOP_MMHG: 12

## 2024-07-30 ASSESSMENT — VISUAL ACUITY: OS_SC: 20/50

## 2024-08-07 ENCOUNTER — SURGERY/PROCEDURE (OUTPATIENT)
Dept: URBAN - METROPOLITAN AREA SURGERY 1 | Facility: SURGERY | Age: 69
End: 2024-08-07

## 2024-08-07 DIAGNOSIS — H25.811: ICD-10-CM

## 2024-08-07 DIAGNOSIS — H57.11: ICD-10-CM

## 2024-08-07 PROCEDURE — 99199PCV PROF CUSTOM VISION PACKAGE

## 2024-08-07 PROCEDURE — 66999LNSR LENSAR LASER FOR CAT SX

## 2024-08-07 PROCEDURE — 66984CV REMOVE CATARACT, INSERT LENS, CUSTOM VISION: Mod: 79,RT

## 2024-08-07 PROCEDURE — 68841 INSJ RX ELUT IMPLT LAC CANAL: CPT | Mod: 51,RT,79,RT

## 2024-08-07 PROCEDURE — 65772LRI LRI DURING CAT SX

## 2024-08-08 ENCOUNTER — POST-OP (OUTPATIENT)
Dept: URBAN - METROPOLITAN AREA CLINIC 2 | Facility: CLINIC | Age: 69
End: 2024-08-08

## 2024-08-08 DIAGNOSIS — Z96.1: ICD-10-CM

## 2024-08-08 PROCEDURE — 99024 POSTOP FOLLOW-UP VISIT: CPT

## 2024-08-08 ASSESSMENT — VISUAL ACUITY
OS_PH: 20/30
OS_SC: 20/100
OD_SC: J3
OD_PH: 20/30
OS_SC: J1+
OD_SC: 20/70

## 2024-08-08 ASSESSMENT — TONOMETRY
OD_IOP_MMHG: 12
OS_IOP_MMHG: 12

## 2024-08-29 ENCOUNTER — POST-OP (OUTPATIENT)
Dept: URBAN - METROPOLITAN AREA CLINIC 2 | Facility: CLINIC | Age: 69
End: 2024-08-29

## 2024-08-29 DIAGNOSIS — H26.493: ICD-10-CM

## 2024-08-29 DIAGNOSIS — Z96.1: ICD-10-CM

## 2024-08-29 PROCEDURE — 99024 POSTOP FOLLOW-UP VISIT: CPT

## 2024-08-29 ASSESSMENT — TONOMETRY
OD_IOP_MMHG: 18
OS_IOP_MMHG: 18
OD_IOP_MMHG: 14
OS_IOP_MMHG: 14

## 2024-08-29 ASSESSMENT — VISUAL ACUITY
OD_SC: 20/60
OS_SC: 20/70

## 2025-01-07 ENCOUNTER — FOLLOW UP (OUTPATIENT)
Age: 70
End: 2025-01-07

## 2025-01-07 DIAGNOSIS — H40.1131: ICD-10-CM

## 2025-01-07 DIAGNOSIS — H26.493: ICD-10-CM

## 2025-01-07 PROCEDURE — 99213 OFFICE O/P EST LOW 20 MIN: CPT

## 2025-01-07 PROCEDURE — 92083 EXTENDED VISUAL FIELD XM: CPT

## 2025-06-17 ENCOUNTER — COMPREHENSIVE EXAM (OUTPATIENT)
Age: 70
End: 2025-06-17

## 2025-06-17 DIAGNOSIS — H04.123: ICD-10-CM

## 2025-06-17 DIAGNOSIS — Z96.1: ICD-10-CM

## 2025-06-17 DIAGNOSIS — H26.493: ICD-10-CM

## 2025-06-17 DIAGNOSIS — H43.813: ICD-10-CM

## 2025-06-17 DIAGNOSIS — H17.13: ICD-10-CM

## 2025-06-17 DIAGNOSIS — H10.45: ICD-10-CM

## 2025-06-17 DIAGNOSIS — H40.1131: ICD-10-CM

## 2025-06-17 PROCEDURE — 92133 CPTRZD OPH DX IMG PST SGM ON: CPT

## 2025-06-17 PROCEDURE — 99214 OFFICE O/P EST MOD 30 MIN: CPT

## 2025-06-17 PROCEDURE — 92015 DETERMINE REFRACTIVE STATE: CPT
